# Patient Record
Sex: FEMALE | Race: WHITE
[De-identification: names, ages, dates, MRNs, and addresses within clinical notes are randomized per-mention and may not be internally consistent; named-entity substitution may affect disease eponyms.]

---

## 2017-01-29 ENCOUNTER — HOSPITAL ENCOUNTER (EMERGENCY)
Dept: HOSPITAL 25 - UCCORT | Age: 22
Discharge: HOME | End: 2017-01-29
Payer: COMMERCIAL

## 2017-01-29 DIAGNOSIS — J32.9: Primary | ICD-10-CM

## 2017-01-29 PROCEDURE — 99202 OFFICE O/P NEW SF 15 MIN: CPT

## 2017-01-29 PROCEDURE — G0463 HOSPITAL OUTPT CLINIC VISIT: HCPCS

## 2017-01-29 NOTE — UC
Throat Pain/Nasal Blair HPI





- HPI Summary


HPI Summary: 





FACIAL PRESSURE AND NASAL CONGESTION FOR ONE WEEK. FEVER AND COUGH. 





- History of Current Complaint


Chief Complaint: UC


Stated Complaint: SORE THROAT,COUGH


Time Seen by Provider: 01/29/17 16:48


Hx Obtained From: Patient


Hx Last Menstrual Period: 1/15/17


Onset/Duration: Gradual Onset, Lasting Weeks, Still Present


Severity: Moderate


Cough: Nonproductive


Associated Signs & Symptoms: Positive: Sinus Discomfort, Nasal Discharge, Fever





- Epiglottits Risk Factors


Epiglottis Risk Factors: Negative





- Allergies/Home Medications


Allergies/Adverse Reactions: 


 Allergies











Allergy/AdvReac Type Severity Reaction Status Date / Time


 


No Known Allergies Allergy   Verified 01/29/17 16:56














PMH/Surg Hx/FS Hx/Imm Hx


Previously Healthy: Yes





- Surgical History


Surgical History: None





- Family History


Known Family History: 


   Negative: Respiratory Disease





- Social History


Occupation: Student


Lives: Alone


Alcohol Use: None


Substance Use Type: None


Smoking Status (MU): Never Smoked Tobacco





Review of Systems


Constitutional: Fever, Chills, Fatigue


Skin: Negative


Eyes: Negative


ENT: Sore Throat


Respiratory: Negative


Cardiovascular: Negative


Gastrointestinal: Negative


Genitourinary: Negative


Motor: Negative


Neurovascular: Negative


Musculoskeletal: Negative


Neurological: Negative


Psychological: Negative


All Other Systems Reviewed And Are Negative: Yes





Physical Exam


Triage Information Reviewed: Yes


Appearance: No Pain Distress, Well-Nourished, Ill-Appearing - MILD


Vital Signs: 


 Initial Vital Signs











Temp  101.7 F   01/29/17 16:58


 


Pulse  117   01/29/17 16:58


 


Resp  16   01/29/17 16:58


 


BP  111/67   01/29/17 16:58


 


Pulse Ox  99   01/29/17 16:58











Vital Signs Reviewed: Yes


Eye Exam: Normal


ENT Exam: Normal


ENT: Positive: Normal ENT inspection, Hearing grossly normal, TMs normal


Dental Exam: Normal


Neck exam: Normal


Neck: Positive: Supple, Nontender


Respiratory Exam: Normal


Respiratory: Positive: Chest non-tender, Lungs clear, Normal breath sounds, No 

respiratory distress


Cardiovascular Exam: Normal


Cardiovascular: Positive: RRR, No Murmur


Abdominal Exam: Normal


Abdomen Description: Positive: Nontender, No Organomegaly


Musculoskeletal Exam: Normal


Neurological Exam: Normal


Psychological Exam: Normal


Psychological: Positive: Normal Response To Family


Skin Exam: Normal





Throat Pain/Nasal Course/Dx





- Differential Dx/Diagnosis


Differential Diagnosis/HQI/PQRI: Pharyngitis, Sinusitis, Tonsillitis, URI


Provider Diagnoses: SINUSITIS





Discharge





- Discharge Plan


Condition: Stable


Disposition: HOME


Prescriptions: 


Amoxicillin/Clavulanate TAB* [Augmentin *] 875 mg PO BID #20 tab


Patient Education Materials:  Sinusitis (ED)


Forms:  *School Release

## 2018-01-25 ENCOUNTER — TRANSCRIPTION ENCOUNTER (OUTPATIENT)
Age: 23
End: 2018-01-25

## 2018-04-24 ENCOUNTER — HOSPITAL ENCOUNTER (EMERGENCY)
Dept: HOSPITAL 25 - UCCORT | Age: 23
Discharge: HOME | End: 2018-04-24
Payer: COMMERCIAL

## 2018-04-24 VITALS — SYSTOLIC BLOOD PRESSURE: 118 MMHG | DIASTOLIC BLOOD PRESSURE: 76 MMHG

## 2018-04-24 DIAGNOSIS — J40: Primary | ICD-10-CM

## 2018-04-24 DIAGNOSIS — J02.9: ICD-10-CM

## 2018-04-24 PROCEDURE — 99212 OFFICE O/P EST SF 10 MIN: CPT

## 2018-04-24 PROCEDURE — G0463 HOSPITAL OUTPT CLINIC VISIT: HCPCS

## 2018-04-24 PROCEDURE — 87651 STREP A DNA AMP PROBE: CPT

## 2018-04-24 NOTE — UC
Throat Pain/Nasal Blair HPI





- HPI Summary


HPI Summary: 





23 yo female with cough x 1 week


now with sore throat


no documented fever


she has had chill


no runny nose


no n/v/d





had flu a few mos ago





no CP or SOB











- History of Current Complaint


Chief Complaint: UCRespiratory


Stated Complaint: SORE THROAT, COUGH


Time Seen by Provider: 04/24/18 17:30


Hx Obtained From: Patient


Hx Last Menstrual Period: 4/13/18


Onset/Duration: Gradual Onset, Lasting Days


Severity: Moderate


Pain Intensity: 5


Pain Scale Used: 0-10 Numeric


Cough: Productive





- Allergies/Home Medications


Allergies/Adverse Reactions: 


 Allergies











Allergy/AdvReac Type Severity Reaction Status Date / Time


 


No Known Allergies Allergy   Verified 04/24/18 17:34











Home Medications: 


 Home Medications





Bcp 1 tab DAILY 04/24/18 [History Confirmed 04/24/18]











PMH/Surg Hx/FS Hx/Imm Hx


Previously Healthy: Yes





- Surgical History


Surgical History: None





- Family History


Known Family History: Positive: Hypertension


   Negative: Respiratory Disease





- Social History


Alcohol Use: None


Substance Use Type: None


Smoking Status (MU): Never Smoked Tobacco





Review of Systems


Constitutional: Chills, Fatigue


Skin: Negative


Eyes: Negative


ENT: Sore Throat


Respiratory: Cough


Cardiovascular: Negative


Gastrointestinal: Negative


Genitourinary: Negative


Motor: Negative


Neurovascular: Negative


Musculoskeletal: Negative


Neurological: Negative


Psychological: Negative


Is Patient Immunocompromised?: No


All Other Systems Reviewed And Are Negative: Yes





Physical Exam


Triage Information Reviewed: Yes


Appearance: Well-Appearing, No Pain Distress, Well-Nourished


Vital Signs: 


 Initial Vital Signs











Temp  98.2 F   04/24/18 17:35


 


Pulse  99   04/24/18 17:35


 


Resp  16   04/24/18 17:35


 


BP  118/76   04/24/18 17:35


 


Pulse Ox  100   04/24/18 17:35











Eyes: Positive: Conjunctiva Clear


ENT: Positive: Hearing grossly normal, Pharyngeal erythema, TMs normal, 

Tonsillar swelling.  Negative: Nasal congestion, Nasal drainage, Tonsillar 

exudate, Trismus, Muffled voice, Hoarse voice


Neck: Positive: Supple, Tenderness @ - ant cervical nodes


Respiratory: Positive: Lungs clear, Normal breath sounds, No respiratory 

distress, No accessory muscle use


Cardiovascular: Positive: RRR, No Murmur


Musculoskeletal: Positive: ROM Intact, No Edema


Neurological: Positive: Alert


Psychological Exam: Normal


Skin Exam: Normal





Diagnostics





- Laboratory


Diagnostic Studies Completed/Ordered: strep (-)





Throat Pain/Nasal Course/Dx





- Differential Dx/Diagnosis


Provider Diagnoses: pharyngitis.  bronchitis





Discharge





- Sign-Out/Discharge


Documenting (check all that apply): Discharge/Admit/Transfer





- Discharge Plan


Condition: Stable


Disposition: HOME


Patient Education Materials:  Acute Bronchitis (ED), Pharyngitis (ED)


Referrals: 


Non Staff,Doctor [Primary Care Provider] - 


Additional Instructions: 


robitussin or mucinex





recheck in 4-5 days if not better





- Billing Disposition and Condition


Condition: STABLE


Disposition: HOME

## 2018-04-30 ENCOUNTER — HOSPITAL ENCOUNTER (EMERGENCY)
Dept: HOSPITAL 25 - UCCORT | Age: 23
Discharge: HOME | End: 2018-04-30
Payer: COMMERCIAL

## 2018-04-30 VITALS — DIASTOLIC BLOOD PRESSURE: 72 MMHG | SYSTOLIC BLOOD PRESSURE: 114 MMHG

## 2018-04-30 DIAGNOSIS — J40: ICD-10-CM

## 2018-04-30 DIAGNOSIS — J32.9: Primary | ICD-10-CM

## 2018-04-30 DIAGNOSIS — Z88.8: ICD-10-CM

## 2018-04-30 PROCEDURE — 99212 OFFICE O/P EST SF 10 MIN: CPT

## 2018-04-30 PROCEDURE — G0463 HOSPITAL OUTPT CLINIC VISIT: HCPCS

## 2018-04-30 PROCEDURE — 71046 X-RAY EXAM CHEST 2 VIEWS: CPT

## 2018-04-30 NOTE — RAD
HISTORY: Cough, shortness of breath



COMPARISONS: None



VIEWS: 4: Frontal dual-energy and lateral views of the chest.



FINDINGS:

CARDIOMEDIASTINAL SILHOUETTE: The cardiomediastinal silhouette is normal.

MINI: The mini are normal.

PLEURA: The costophrenic angles are sharp. No pleural abnormalities are noted.

LUNG PARENCHYMA: The lungs are clear.

ABDOMEN: The upper abdomen is clear. There is no subphrenic gas.

BONES AND SOFT TISSUES: There is a scoliotic curvature of the spine

OTHER: None.



IMPRESSION:

NO ACTIVE CARDIOPULMONARY DISEASE.

## 2018-06-06 ENCOUNTER — TRANSCRIPTION ENCOUNTER (OUTPATIENT)
Age: 23
End: 2018-06-06

## 2018-08-06 ENCOUNTER — FORM ENCOUNTER (OUTPATIENT)
Age: 23
End: 2018-08-06

## 2018-12-06 ENCOUNTER — HOSPITAL ENCOUNTER (EMERGENCY)
Dept: HOSPITAL 25 - UCCORT | Age: 23
Discharge: HOME | End: 2018-12-06
Payer: COMMERCIAL

## 2018-12-06 VITALS — DIASTOLIC BLOOD PRESSURE: 77 MMHG | SYSTOLIC BLOOD PRESSURE: 113 MMHG

## 2018-12-06 DIAGNOSIS — Z88.8: ICD-10-CM

## 2018-12-06 DIAGNOSIS — Z88.6: ICD-10-CM

## 2018-12-06 DIAGNOSIS — N39.0: Primary | ICD-10-CM

## 2018-12-06 PROCEDURE — G0463 HOSPITAL OUTPT CLINIC VISIT: HCPCS

## 2018-12-06 PROCEDURE — 87086 URINE CULTURE/COLONY COUNT: CPT

## 2018-12-06 PROCEDURE — 81003 URINALYSIS AUTO W/O SCOPE: CPT

## 2018-12-06 PROCEDURE — 99212 OFFICE O/P EST SF 10 MIN: CPT

## 2018-12-06 NOTE — UC
Complaint Female HPI





- HPI Summary


HPI Summary: 





Pt c/o sudden onset urinary symptoms of frequency, murgency an dfeeling that 

she is not emptying her bladder X 2 days. Pt denies risk of pregnancy or STI.





- History Of Current Complaint


Stated Complaint: URINARY


Time Seen by Provider: 12/06/18 12:12


Hx Obtained From: Patient


Hx Last Menstrual Period: 11/26


Pregnant?: No


Onset/Duration: Sudden Onset


Timing: Intermittent


Severity Initially: Mild


Severity Currently: Mild


Pain Intensity: 5


Character: Not Applicable - urinary urgency and frequency


Aggravating Factor(s): Urination


Alleviating Factor(s): Nothing


Associated Signs And Symptoms: Positive: Negative





- Risk Factors


Ectopic Pregnancy Risk Factor: Negative


Ovarian Torsion Risk Factor: Reproductive Age





- Allergies/Home Medications


Allergies/Adverse Reactions: 


 Allergies











Allergy/AdvReac Type Severity Reaction Status Date / Time


 


acetaminophen [From NyQuil] Allergy  Hives Verified 04/30/18 19:26


 


dextromethorphan Allergy  Hives Verified 04/30/18 19:25





[From NyQuil]     


 


doxylamine [From NyQuil] Allergy  Hives Verified 12/06/18 12:30


 


pseudoephedrine [From NyQuil] Allergy  Hives Verified 04/30/18 19:25











Home Medications: 


 Home Medications





Ibuprofen TAB* [Motrin TAB* 400 MG] 400 mg PO ONCE PRN 12/06/18 [History 

Confirmed 12/06/18]











PMH/Surg Hx/FS Hx/Imm Hx


Previously Healthy: Yes





- Surgical History


Surgical History: None





- Family History


Known Family History: Positive: Hypertension


   Negative: Respiratory Disease





- Social History


Occupation: Student


Lives: Dormitory/Roommates


Alcohol Use: Weekly


Alcohol Amount: 4


Substance Use Type: None


Smoking Status (MU): Never Smoked Tobacco


Have You Smoked in the Last Year: No





- Immunization History


Vaccination Up to Date: Yes





Review of Systems


All Other Systems Reviewed And Are Negative: Yes


Constitutional: Positive: Negative


Skin: Positive: Negative


Eyes: Positive: Negative


ENT: Positive: Negative


Respiratory: Positive: Negative


Cardiovascular: Positive: Negative


Gastrointestinal: Positive: Negative


Genitourinary: Positive: Frequency, Urgency


Motor: Positive: Negative


Neurovascular: Positive: Negative


Musculoskeletal: Positive: Negative


Neurological: Positive: Negative


Psychological: Positive: Negative


Is Patient Immunocompromised?: No





Physical Exam


Triage Information Reviewed: Yes


Appearance: Well-Appearing


Vital Signs: 


 Initial Vital Signs











Temp  99.5 F   12/06/18 12:36


 


Pulse  94   12/06/18 12:36


 


Resp  20   12/06/18 12:36


 


BP  113/77   12/06/18 12:36


 


Pulse Ox  100   12/06/18 12:36











Vital Signs Reviewed: Yes


Eye Exam: Normal


ENT Exam: Normal


Dental Exam: Normal


Neck exam: Normal


Respiratory Exam: Normal


Cardiovascular Exam: Normal


Abdominal Exam: Normal


Abdomen Description: Positive: Nontender


Musculoskeletal Exam: Normal


Neurological Exam: Normal


Psychological Exam: Normal


Skin Exam: Normal





 Complaint Female Dx





- Differential Dx/Diagnosis


Differential Diagnosis/HQI/PQRI: Urinary Tract Infection


Provider Diagnosis: 


 UTI (urinary tract infection)








Discharge





- Sign-Out/Discharge


Documenting (check all that apply): Patient Departure


All imaging exams completed and their final reports reviewed: No Studies





- Discharge Plan


Condition: Stable


Disposition: HOME


Prescriptions: 


Nitrofurantoin Monohyd/M-Cryst [Macrobid 100 mg Capsule] 100 mg PO Q12H #10 cap


Patient Education Materials:  Urinary Tract Infection in Women (ED)


Referrals: 


[facility] [Outside]


No Primary Care Phys,NOPCP [Primary Care Provider] - 





- Billing Disposition and Condition


Condition: STABLE


Disposition: Home





- Attestation Statements


Provider Attestation: 


Per institutional requirements, I have reviewed the chart, however, I was not 

consulted specifically or made aware of this patient by the  midlevel provider.

  I did not personally evaluate, interact with , or disposition  this patient.

## 2018-12-08 NOTE — UC
- Progress Note


Progress Note: 


UTI cx negative - can dc macrobid








Course/Dx





- Diagnoses


Provider Diagnoses: 


 UTI (urinary tract infection)








Discharge





- Sign-Out/Discharge


Documenting (check all that apply): Post-Discharge Follow Up


All imaging exams completed and their final reports reviewed: No Studies





- Discharge Plan


Condition: Stable


Disposition: HOME


Prescriptions: 


Nitrofurantoin Monohyd/M-Cryst [Macrobid 100 mg Capsule] 100 mg PO Q12H #10 cap


Patient Education Materials:  Urinary Tract Infection in Women (ED)


Referrals: 


[facility] [Outside]


No Primary Care Phys,NOPCP [Primary Care Provider] - 





- Billing Disposition and Condition


Condition: STABLE


Disposition: Home

## 2018-12-10 ENCOUNTER — HOSPITAL ENCOUNTER (EMERGENCY)
Dept: HOSPITAL 25 - UCCORT | Age: 23
Discharge: HOME | End: 2018-12-10
Payer: COMMERCIAL

## 2018-12-10 VITALS — SYSTOLIC BLOOD PRESSURE: 108 MMHG | DIASTOLIC BLOOD PRESSURE: 70 MMHG

## 2018-12-10 DIAGNOSIS — Z88.8: ICD-10-CM

## 2018-12-10 DIAGNOSIS — R14.0: Primary | ICD-10-CM

## 2018-12-10 DIAGNOSIS — R10.30: ICD-10-CM

## 2018-12-10 PROCEDURE — G0463 HOSPITAL OUTPT CLINIC VISIT: HCPCS

## 2018-12-10 PROCEDURE — 81003 URINALYSIS AUTO W/O SCOPE: CPT

## 2018-12-10 PROCEDURE — 99211 OFF/OP EST MAY X REQ PHY/QHP: CPT

## 2018-12-10 NOTE — UC
Complaint Female HPI





- HPI Summary


HPI Summary: 





24 y/o female presents to the urgent care c/o lower abdominal pain radiating to 

her lower back since yesterday when she stopped taking Macrobid. Pt reports she 

was seen here at the clinic on 12/6/2018 and Dx w/ UTI and Rx Macrobid. She was 

called yesterday and told to stop antibiotic since urine culture returned no 

growth.  She had an episode of diarrhea yesterday and then she developed mild 

cramping pain. This morning she has decrease appetite and diarrhea resolved. 

She has been drinking fluids. Today pain is dull 4/10 at times associated w/ 

gas. Pt denies urinary symptoms, fever, flank pain, vaginal discharge, SOB, 

chest pain, N/V/D. Hx of STD's.  LMP:11/26/2018.


 





- History Of Current Complaint


Chief Complaint: UCAbdominalPain


Stated Complaint: RECHECK URINARY


Time Seen by Provider: 12/10/18 11:25


Hx Obtained From: Patient


Hx Last Menstrual Period: 11/26/18


Onset/Duration: Gradual Onset, Lasting Days - 1 day, Still Present


Timing: Intermittent


Severity Initially: Mild


Severity Currently: Mild


Pain Intensity: 4


Pain Scale Used: 0-10 Numeric


Character: Cramping


Aggravating Factor(s): Nothing


Alleviating Factor(s): Meds - ibuprofen


Associated Signs And Symptoms: Positive: Back Pain - lower back pain.  Negative

: Fever, Vaginal Bleeding/Discharge, Vaginal Discharge, Genital Swelling, 

Genital Blisters





- Risk Factors


Ectopic Pregnancy Risk Factor: Negative


Ovarian Torsion Risk Factor: Negative





- Allergies/Home Medications


Allergies/Adverse Reactions: 


 Allergies











Allergy/AdvReac Type Severity Reaction Status Date / Time


 


dextromethorphan Allergy  Hives Verified 12/10/18 10:25





[From NyQuil]     


 


doxylamine [From NyQuil] Allergy  Hives Verified 12/10/18 10:25


 


pseudoephedrine [From NyQuil] Allergy  Hives Verified 12/10/18 10:25











Home Medications: 


 Home Medications





Norgestimate-Ethinyl Estradiol [Tri Femynor 0.18/0.215/0.25 mg-35 Mcg] 1 tab PO 

DAILY 12/10/18 [History Confirmed 12/10/18]











PMH/Surg Hx/FS Hx/Imm Hx


Previously Healthy: Yes - Pt denies PMHX





- Surgical History


Surgical History: None





- Family History


Known Family History: Positive: Hypertension, Diabetes


   Negative: Respiratory Disease





- Social History


Occupation: Student


Lives: With Family


Alcohol Use: Weekly


Alcohol Amount: 4


Substance Use Type: None


Smoking Status (MU): Never Smoked Tobacco


Have You Smoked in the Last Year: No





- Immunization History


Vaccination Up to Date: Yes





Review of Systems


All Other Systems Reviewed And Are Negative: Yes


Constitutional: Positive: Negative


Skin: Positive: Negative


Eyes: Positive: Negative


ENT: Positive: Negative


Respiratory: Positive: Negative


Cardiovascular: Positive: Negative


Gastrointestinal: Positive: Abdominal Pain - lower abdominal pain w/ gas, 

Diarrhea -  1 episode which resolved


Genitourinary: Positive: Negative


Motor: Positive: Negative


Neurovascular: Positive: Negative


Musculoskeletal: Positive: Negative


Neurological: Positive: Negative


Psychological: Positive: Negative


Is Patient Immunocompromised?: No





Physical Exam





- Summary


Physical Exam Summary: 





Vital Signs Reviewed: Yes


General:Patient is a well developed and nourished female  who is sitting 

comfortable in the examining table.   Patient is not in any acute respiratory 

distress. 


Eyes: Positive: Conjunctiva Clear - PERRLA, EOMI, fundi grossly normal


ENT: Positive: Normal ENT inspection, Hearing grossly normal, Pharynx normal, 

TMs normal


Neck: Positive: Supple, Nontender, No Lymphadenopathy


Respiratory: Positive: Chest non-tender, Lungs clear, Normal breath sounds, No 

respiratory distress


Cardiovascular: Positive: RRR,S1 and S2 present, No Murmur, Pulses Normal, 

Brisk Capillary Refill


Abdomen Description: Positive: Nontender, Abd: Flat with no distention. No 

surface trauma, scars, incisions.  hyperactive bowel sounds  present in all 

four quadrants.  No tenderness, guarding, rigidity to palpation. No masses 

palpated, no pulsation in epigastric area.  No organomegaly.  Negative [place] 

signs.  No periumbilical tenderness.  No rebound in the lower quadrants.  NT 

over McBurneys point.   Good femoral pulses bilaterally.  No hernia noted. No 

CVAT bilaterally


Musculoskeletal: Positive: Strength Intact, ROM Intact, No Edema,FROM in all 

major joints, no edema, no cyanosis or clubbing.


Neuro: Alert and oriented x 3. No acute neurological deficits. Speech is normal.


Psychological: WNL


Skin: Dry and warm





Triage Information Reviewed: Yes


Vital Signs: 


 Initial Vital Signs











Temp  97.9 F   12/10/18 10:27


 


Pulse  83   12/10/18 10:27


 


Resp  18   12/10/18 10:27


 


BP  108/70   12/10/18 10:27


 


Pulse Ox  100   12/10/18 10:27














 Complaint Female Dx





- Course


Course Of Treatment: 24 y/o female presents to the urgent care c/o lower 

abdominal pain radiating to her lower back since yesterday when she stopped 

taking Macrobid. Pt reports she was seen here at the clinic on 12/6/2018 and Dx 

w/ UTI and Rx Macrobid. She was called yesterday and told to stop antibiotic 

since urine culture returned no growth.  She had an episode of diarrhea 

yesterday and then she developed mild cramping pain. This morning she has 

decrease appetite and diarrhea resolved. She has been drinking fluids. Today 

pain is dull 4/10 at times associated w/ gas. Pt denies urinary symptoms, fever

, flank pain, vaginal discharge, SOB, chest pain, N/V/D. Hx of STD's.  LMP:11/26 /2018.Hx obtained.  PE:WNL. UA=negative. Pt's symptoms may be possible due to 

gas and bloating since positive hyperactive bowel sounds present. Pt advised to 

take yogurt w/ probiotics or Culturelle to alleviate symptoms. D/c instructions 

explained. Pt understood and agreed w/ plan of care.





- Differential Dx/Diagnosis


Differential Diagnosis/HQI/PQRI: Cervicitis, Renal Colic, Sexually Transmitted 

Disease, Ureteral Stone, Urinary Tract Infection, Other


Provider Diagnosis: 


 Gas bloat syndrome, Lower abdominal pain of unknown etiology








Discharge





- Sign-Out/Discharge


Documenting (check all that apply): Patient Departure - D/c home


All imaging exams completed and their final reports reviewed: No Studies





- Discharge Plan


Condition: Stable


Disposition: HOME


Patient Education Materials:  Gas and Bloating (ED), Abdominal Pain (ED)


Referrals: 


[facility] [Outside]


Additional Instructions: 


1- Please continue Ibuprofen PO after meals. Drink hot water or hot tea to 

relieve gas.  Limiting dietary ingestion of known gas-producing foods such as 

cabbage, onions, broccoli, brussel sprouts, wheat, and potatoes can help reduce 

symptoms. Also avoid drinking carbonated beverages, and gulping food or 

liquids.  Please take yogurt w/ probiotics or culturelle since probably the 

antibiotic you stop upset your GI young. 


2 - If symptoms  worsen and you develop severe abdominal pain please go 

immediately to the ER or your PCP for further evaluation and treatment. 


3- UA was completely negative





- Billing Disposition and Condition


Condition: STABLE


Disposition: Home

## 2019-01-01 ENCOUNTER — FORM ENCOUNTER (OUTPATIENT)
Age: 24
End: 2019-01-01

## 2019-02-20 ENCOUNTER — TRANSCRIPTION ENCOUNTER (OUTPATIENT)
Age: 24
End: 2019-02-20

## 2019-03-26 ENCOUNTER — TRANSCRIPTION ENCOUNTER (OUTPATIENT)
Age: 24
End: 2019-03-26

## 2019-11-05 ENCOUNTER — TRANSCRIPTION ENCOUNTER (OUTPATIENT)
Age: 24
End: 2019-11-05

## 2019-12-22 ENCOUNTER — TRANSCRIPTION ENCOUNTER (OUTPATIENT)
Age: 24
End: 2019-12-22

## 2020-02-11 ENCOUNTER — FORM ENCOUNTER (OUTPATIENT)
Age: 25
End: 2020-02-11

## 2020-10-30 ENCOUNTER — TRANSCRIPTION ENCOUNTER (OUTPATIENT)
Age: 25
End: 2020-10-30

## 2021-08-16 ENCOUNTER — FORM ENCOUNTER (OUTPATIENT)
Age: 26
End: 2021-08-16

## 2021-11-22 DIAGNOSIS — Z78.9 OTHER SPECIFIED HEALTH STATUS: ICD-10-CM

## 2021-11-22 PROBLEM — Z00.00 ENCOUNTER FOR PREVENTIVE HEALTH EXAMINATION: Status: ACTIVE | Noted: 2021-11-22

## 2022-02-08 ENCOUNTER — APPOINTMENT (OUTPATIENT)
Dept: OBGYN | Facility: CLINIC | Age: 27
End: 2022-02-08
Payer: COMMERCIAL

## 2022-02-08 VITALS
HEIGHT: 69 IN | DIASTOLIC BLOOD PRESSURE: 60 MMHG | BODY MASS INDEX: 17.77 KG/M2 | WEIGHT: 120 LBS | SYSTOLIC BLOOD PRESSURE: 108 MMHG

## 2022-02-08 PROCEDURE — 99213 OFFICE O/P EST LOW 20 MIN: CPT

## 2022-02-08 NOTE — HISTORY OF PRESENT ILLNESS
[Y] : Patient is sexually active [N] : Patient denies prior pregnancies [Normal Amount/Duration] :  normal amount and duration [Regular Cycle Intervals] : periods have been regular [On BCP at conception] : the patient was on BCP at conception [Men] : men [Vaginal] : vaginal [No] : No [Previously active] : previously active [TextBox_4] : PT HERE FOR BIRTH CONTROL RENEWAL. PT IS ON [facility].  [LMPDate] : 01/15/22 [MensesFreq] : 28 [MensesLength] : 6 [TextBox_6] : 01/15/22 [TextBox_9] : 16 [TextBox_13] : 28 [TextBox_15] : 6 [FreeTextEntry1] : 01/15/22

## 2022-02-08 NOTE — PHYSICAL EXAM
[Chaperone Present] : A chaperone was present in the examining room during all aspects of the physical examination [FreeTextEntry1] : SM [Examination Of The Breasts] : a normal appearance [Normal] : normal [No Masses] : no breast masses were palpable

## 2022-02-08 NOTE — DISCUSSION/SUMMARY
[FreeTextEntry1] : Doing well on try Femynor will continue with current regimen refilled for 6 months all risk benefits pros cons discussed in layman's terms all questions answered return in 6 months for yearly checkup

## 2022-07-26 ENCOUNTER — APPOINTMENT (OUTPATIENT)
Dept: OBGYN | Facility: CLINIC | Age: 27
End: 2022-07-26

## 2022-07-26 VITALS
DIASTOLIC BLOOD PRESSURE: 68 MMHG | BODY MASS INDEX: 18.05 KG/M2 | WEIGHT: 115 LBS | SYSTOLIC BLOOD PRESSURE: 120 MMHG | HEIGHT: 67 IN

## 2022-07-26 DIAGNOSIS — Z11.51 ENCOUNTER FOR SCREENING FOR HUMAN PAPILLOMAVIRUS (HPV): ICD-10-CM

## 2022-07-26 DIAGNOSIS — Z11.3 ENCOUNTER FOR SCREENING FOR INFECTIONS WITH A PREDOMINANTLY SEXUAL MODE OF TRANSMISSION: ICD-10-CM

## 2022-07-26 PROCEDURE — 99395 PREV VISIT EST AGE 18-39: CPT

## 2022-07-26 NOTE — PHYSICAL EXAM
[Chaperone Present] : A chaperone was present in the examining room during all aspects of the physical examination [Appropriately responsive] : appropriately responsive [Alert] : alert [No Acute Distress] : no acute distress [No Lymphadenopathy] : no lymphadenopathy [Regular Rate Rhythm] : regular rate rhythm [No Murmurs] : no murmurs [Clear to Auscultation B/L] : clear to auscultation bilaterally [Soft] : soft [Non-tender] : non-tender [Non-distended] : non-distended [No HSM] : No HSM [No Lesions] : no lesions [No Mass] : no mass [Oriented x3] : oriented x3 [Examination Of The Breasts] : a normal appearance [No Masses] : no breast masses were palpable [Labia Majora] : normal [Labia Minora] : normal [Normal] : normal [Uterine Adnexae] : normal [Declined] : Patient declined rectal exam [FreeTextEntry1] : sm [FreeTextEntry3] : Enlarged thyroid [FreeTextEntry8] : No masses nontender

## 2022-07-26 NOTE — HISTORY OF PRESENT ILLNESS
[Patient reported colonoscopy was normal] : Patient reported colonoscopy was normal [HIV test declined] : HIV test declined [Syphilis test declined] : Syphilis test declined [Gonorrhea test declined] : Gonorrhea test declined [Chlamydia test declined] : Chlamydia test declined [Trichomonas test declined] : Trichomonas test declined [HPV test declined] : HPV test declined [Hepatitis B test declined] : Hepatitis B test declined [Y] : Patient is sexually active [N] : Patient denies prior pregnancies [Normal Amount/Duration] :  normal amount and duration [Regular Cycle Intervals] : periods have been regular [Currently Active] : currently active [Men] : men [Vaginal] : vaginal [No] : No [Yes] : Yes [Condoms] : Condoms [TextBox_4] : PT HERE FOR ANNUAL EXAM AND TO REFILL TRI FEMYNOR.  [PapSmeardate] : 08/17/21 [TextBox_31] : NORMAL  [ColonoscopyDate] : 08/01/21 [LMPDate] : 07/04/22 [MensesLength] : 7 [MensesFreq] : 28 [TextBox_6] : 07/04/22 [TextBox_9] : 16 [TextBox_13] : 28 [TextBox_15] : 7 [FreeTextEntry1] : 07/04/22 [FreeTextEntry3] : PILLS

## 2022-07-26 NOTE — PLAN
[FreeTextEntry1] : 26-year-old with regular menses Triphed normal, history of IBS, history of hypothyroidism, will give recommendation for endocrinologist Dr. Kovacs, will continue multivitamin she is taking salt pills for orthostatic hypotension.  Breast self-exam taught will return in 6 months

## 2022-07-27 LAB
C TRACH RRNA SPEC QL NAA+PROBE: NOT DETECTED
HPV HIGH+LOW RISK DNA PNL CVX: NOT DETECTED
N GONORRHOEA RRNA SPEC QL NAA+PROBE: NOT DETECTED
SOURCE TP AMPLIFICATION: NORMAL

## 2022-08-01 LAB — CYTOLOGY CVX/VAG DOC THIN PREP: NORMAL

## 2023-01-03 ENCOUNTER — NON-APPOINTMENT (OUTPATIENT)
Age: 28
End: 2023-01-03

## 2023-01-03 ENCOUNTER — APPOINTMENT (OUTPATIENT)
Dept: OBGYN | Facility: CLINIC | Age: 28
End: 2023-01-03
Payer: COMMERCIAL

## 2023-01-03 VITALS
DIASTOLIC BLOOD PRESSURE: 72 MMHG | SYSTOLIC BLOOD PRESSURE: 114 MMHG | BODY MASS INDEX: 18.05 KG/M2 | WEIGHT: 115 LBS | HEIGHT: 67 IN

## 2023-01-03 DIAGNOSIS — N92.0 EXCESSIVE AND FREQUENT MENSTRUATION WITH REGULAR CYCLE: ICD-10-CM

## 2023-01-03 PROCEDURE — 99213 OFFICE O/P EST LOW 20 MIN: CPT

## 2023-01-03 NOTE — HISTORY OF PRESENT ILLNESS
[Patient reported colonoscopy was normal] : Patient reported colonoscopy was normal [Y] : Patient uses contraception [N] : Patient denies prior pregnancies [Normal Amount/Duration] :  normal amount and duration [Regular Cycle Intervals] : periods have been regular [Previously active] : previously active [Men] : men [Vaginal] : vaginal [No] : No [Yes] : Yes [TextBox_4] : PT HERE FOR NC RENEWAL\par PT IS ON TRI FEMYNOR [ColonoscopyDate] : 2021 [LMPDate] : 12/17/22 [MensesFreq] : 28 [MensesLength] : 7 [TextBox_6] : 12/17/22 [TextBox_9] : 17 [TextBox_13] : 28 [TextBox_15] : 7 [FreeTextEntry1] : 12/17/22

## 2023-01-03 NOTE — PLAN
no [FreeTextEntry1] : Continue Tri-Estarylla longer available is try Femynor risk benefits pros cons discussed return in 6 months

## 2023-08-01 ENCOUNTER — APPOINTMENT (OUTPATIENT)
Dept: OBGYN | Facility: CLINIC | Age: 28
End: 2023-08-01
Payer: COMMERCIAL

## 2023-08-01 ENCOUNTER — NON-APPOINTMENT (OUTPATIENT)
Age: 28
End: 2023-08-01

## 2023-08-01 VITALS
WEIGHT: 115 LBS | SYSTOLIC BLOOD PRESSURE: 120 MMHG | HEIGHT: 67 IN | DIASTOLIC BLOOD PRESSURE: 80 MMHG | BODY MASS INDEX: 18.05 KG/M2

## 2023-08-01 DIAGNOSIS — Z01.419 ENCOUNTER FOR GYNECOLOGICAL EXAMINATION (GENERAL) (ROUTINE) W/OUT ABNORMAL FINDINGS: ICD-10-CM

## 2023-08-01 PROCEDURE — 99395 PREV VISIT EST AGE 18-39: CPT

## 2023-08-01 NOTE — PLAN
[FreeTextEntry1] : Pap smear completed, note Pap smear was limited secondary to patient having blood-tinged discharge.  We will renew the Tri-Estarylla risk-benefit analysis discussed she will return in 6 months

## 2023-08-01 NOTE — PHYSICAL EXAM
[Chaperone Present] : A chaperone was present in the examining room during all aspects of the physical examination [FreeTextEntry1] : SM [Appropriately responsive] : appropriately responsive [Alert] : alert [No Acute Distress] : no acute distress [No Lymphadenopathy] : no lymphadenopathy [Regular Rate Rhythm] : regular rate rhythm [No Murmurs] : no murmurs [Clear to Auscultation B/L] : clear to auscultation bilaterally [Soft] : soft [Non-tender] : non-tender [Non-distended] : non-distended [No HSM] : No HSM [No Lesions] : no lesions [No Mass] : no mass [Oriented x3] : oriented x3 [FreeTextEntry6] : Symmetrical [Examination Of The Breasts] : a normal appearance [No Masses] : no breast masses were palpable [Labia Majora] : normal [Labia Minora] : normal [Normal] : normal [Anteversion] : anteverted [Uterine Adnexae] : normal [Declined] : Patient declined rectal exam [FreeTextEntry8] : No masses nontender

## 2023-08-01 NOTE — HISTORY OF PRESENT ILLNESS
[HIV test declined] : HIV test declined [Syphilis test declined] : Syphilis test declined [Gonorrhea test declined] : Gonorrhea test declined [Chlamydia test declined] : Chlamydia test declined [Trichomonas test declined] : Trichomonas test declined [HPV test declined] : HPV test declined [Hepatitis B test declined] : Hepatitis B test declined [Hepatitis C test declined] : Hepatitis C test declined [Y] : Patient is sexually active [N] : Patient denies prior pregnancies [Normal Amount/Duration] :  normal amount and duration [Regular Cycle Intervals] : periods have been regular [Currently Active] : currently active [Men] : men [Vaginal] : vaginal [No] : No [Yes] : Yes [TextBox_4] : ANNUAL/BC RENEWAL LMP:7/25/2023 [PapSmeardate] : 07/26/22 [TextBox_31] : WNL [MensesFreq] : 28 [MensesLength] : 5 [TextBox_6] : 7/25/23 [TextBox_9] : 17 [TextBox_13] : 28 [TextBox_15] : 5 [FreeTextEntry1] : 7/25/23 [FreeTextEntry3] : PILLS

## 2023-08-04 LAB
C TRACH RRNA SPEC QL NAA+PROBE: NOT DETECTED
CYTOLOGY CVX/VAG DOC THIN PREP: NORMAL
HPV HIGH+LOW RISK DNA PNL CVX: NOT DETECTED
N GONORRHOEA RRNA SPEC QL NAA+PROBE: NOT DETECTED
SOURCE TP AMPLIFICATION: NORMAL

## 2024-02-06 ENCOUNTER — APPOINTMENT (OUTPATIENT)
Dept: OBGYN | Facility: CLINIC | Age: 29
End: 2024-02-06
Payer: COMMERCIAL

## 2024-02-06 VITALS
HEIGHT: 68 IN | DIASTOLIC BLOOD PRESSURE: 77 MMHG | SYSTOLIC BLOOD PRESSURE: 126 MMHG | WEIGHT: 120 LBS | BODY MASS INDEX: 18.19 KG/M2

## 2024-02-06 DIAGNOSIS — Z30.41 ENCOUNTER FOR SURVEILLANCE OF CONTRACEPTIVE PILLS: ICD-10-CM

## 2024-02-06 PROCEDURE — 99213 OFFICE O/P EST LOW 20 MIN: CPT

## 2024-02-06 NOTE — PLAN
[FreeTextEntry1] : We will renew Tri-Estarylla, all risk benefits discussed questions answered in layman's terms patient to return in 6 months

## 2024-02-06 NOTE — HISTORY OF PRESENT ILLNESS
[TextBox_4] : PT HERE FOR BC RENEWAL  PT IS ON TRI ESTARYLLA  LMP: 1/11/24 [LMPDate] : 1/11/24 [TextBox_6] : 1/11/24 [FreeTextEntry1] : 1/11/24

## 2024-05-03 RX ORDER — NORGESTIMATE AND ETHINYL ESTRADIOL 7DAYSX3 28
0.18/0.215/0.25 KIT ORAL DAILY
Qty: 3 | Refills: 1 | Status: ACTIVE | COMMUNITY
Start: 1900-01-01 | End: 1900-01-01

## 2024-08-06 ENCOUNTER — APPOINTMENT (OUTPATIENT)
Dept: OBGYN | Facility: CLINIC | Age: 29
End: 2024-08-06

## 2024-08-12 ENCOUNTER — APPOINTMENT (OUTPATIENT)
Dept: OBGYN | Facility: CLINIC | Age: 29
End: 2024-08-12